# Patient Record
Sex: MALE | Race: WHITE | NOT HISPANIC OR LATINO | URBAN - NONMETROPOLITAN AREA
[De-identification: names, ages, dates, MRNs, and addresses within clinical notes are randomized per-mention and may not be internally consistent; named-entity substitution may affect disease eponyms.]

---

## 2019-10-16 NOTE — PROCEDURE NOTE: SURGICAL
Prior to commencing surgery, patient identification, surgical procedure, site, and side were confirmed by Dr. Yimi Horowitz. Following topical proparacaine anesthesia, the patient was positioned at the YAG laser, a contact lens coupled to the cornea of the right eye with methylcellulose and a peripheral iridotomy placed using 2.9 Mj x9 without complication. Attention was turned to the left eye and the patient was positioned at the YAG laser, a contact lens coupled to the cornea with methylcellulose and a peripheral iridotomy placed using 2.9 Mj x 12 without complication. Excess methylcellulose was washed from both eyes, one drop of Econopred Plus 1% instilled and the patient returned to the holding area having tolerated the procedure well and without complication. <br />[facility]<FC /><br />

## 2021-01-27 ENCOUNTER — IMPORTED ENCOUNTER (OUTPATIENT)
Dept: URBAN - NONMETROPOLITAN AREA CLINIC 1 | Facility: CLINIC | Age: 66
End: 2021-01-27

## 2021-01-27 PROBLEM — H25.13: Noted: 2021-01-27

## 2021-01-27 PROBLEM — E11.3313: Noted: 2021-01-27

## 2021-01-27 PROCEDURE — 99204 OFFICE O/P NEW MOD 45 MIN: CPT

## 2021-01-27 PROCEDURE — 92250 FUNDUS PHOTOGRAPHY W/I&R: CPT

## 2021-01-27 NOTE — PATIENT DISCUSSION
NIDDM with DR-Stressed the importance of keeping blood sugars under control blood pressure under control and weight normalization and regular visits with PCP. -Explained the possible effects of poorly controlled diabetes and the damage that diabetes can cause to ocular health. -Patient to check HgbA1C.-Pt instructed to contact our office with any vision changes.-Patient has a history of VIVIENNE OD and PRP OD with Dr. Jurgen Butler but was stable for a six month period following last Tx and was released from care. Patient was instructed to contact Dr. Jurgen Butler office if sympotms recur. -No treatemet is currently indicated. Recommend F/U in 6 months with OCT mac. Cataract OU-Not yet surgical. -Reviewed symptoms of advancing cataract growth such as glare and halos and decreased vision.-Continue to monitor for now. Pt will notify us if any new symptoms develop.

## 2022-04-10 ASSESSMENT — VISUAL ACUITY
OS_SC: 20/25
OD_PH: 20/80
OD_SC: 20/100

## 2022-04-10 ASSESSMENT — TONOMETRY
OS_IOP_MMHG: 15
OD_IOP_MMHG: 15

## 2023-01-31 NOTE — PATIENT DISCUSSION
The types of intraocular lenses were reviewed with the patient along with a discussion of their various strengths and weaknesses. Patient a candidate up to [facility].